# Patient Record
Sex: MALE | Race: WHITE | NOT HISPANIC OR LATINO | Employment: FULL TIME | URBAN - NONMETROPOLITAN AREA
[De-identification: names, ages, dates, MRNs, and addresses within clinical notes are randomized per-mention and may not be internally consistent; named-entity substitution may affect disease eponyms.]

---

## 2018-04-23 ENCOUNTER — OUTSIDE FACILITY SERVICE (OUTPATIENT)
Dept: CARDIOLOGY | Facility: CLINIC | Age: 23
End: 2018-04-23

## 2018-04-24 ENCOUNTER — OFFICE VISIT (OUTPATIENT)
Dept: CARDIOLOGY | Facility: CLINIC | Age: 23
End: 2018-04-24

## 2018-04-24 VITALS
OXYGEN SATURATION: 98 % | HEART RATE: 93 BPM | WEIGHT: 232 LBS | SYSTOLIC BLOOD PRESSURE: 108 MMHG | BODY MASS INDEX: 35.16 KG/M2 | DIASTOLIC BLOOD PRESSURE: 80 MMHG | HEIGHT: 68 IN

## 2018-04-24 DIAGNOSIS — R07.9 CHEST PAIN, UNSPECIFIED TYPE: Primary | ICD-10-CM

## 2018-04-24 DIAGNOSIS — I49.1 PREMATURE ATRIAL CONTRACTIONS: ICD-10-CM

## 2018-04-24 DIAGNOSIS — I47.1 PAROXYSMAL SVT (SUPRAVENTRICULAR TACHYCARDIA) (HCC): ICD-10-CM

## 2018-04-24 DIAGNOSIS — I49.3 PREMATURE VENTRICULAR BEAT: ICD-10-CM

## 2018-04-24 PROBLEM — E66.9 CLASS 1 OBESITY WITHOUT SERIOUS COMORBIDITY WITH BODY MASS INDEX (BMI) OF 34.0 TO 34.9 IN ADULT: Status: ACTIVE | Noted: 2018-04-24

## 2018-04-24 PROCEDURE — 99243 OFF/OP CNSLTJ NEW/EST LOW 30: CPT | Performed by: NURSE PRACTITIONER

## 2018-04-24 RX ORDER — LANOLIN ALCOHOL/MO/W.PET/CERES
500 CREAM (GRAM) TOPICAL NIGHTLY
COMMUNITY

## 2018-04-24 RX ORDER — ERGOCALCIFEROL 1.25 MG/1
50000 CAPSULE ORAL WEEKLY
COMMUNITY

## 2018-04-24 NOTE — PROGRESS NOTES
Subjective:     Encounter Date:04/24/2018    Chief Complaint:    Patient ID: Km Bess is a 22 y.o. male here today for referred for cardiac evaluation of SVT seen on recent Holter Monitor by THOMAS Mcgraw after being evaluated by her for chest pain and fatigue.    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 He had an episode of blurred vision, flushing, and palpitations while at work late March 2018. His Holter monitor revealed frequent PACs, rare PVCs, and occasional brief episodes of SVT primarily atrial tachycardia were identified. His minimum heart rate was 38 bpm at 9 pm with longest pause of 1.7 seconds. He reports his mother has PACs. He has also experienced chest pain intermittently about 3 weeks ago that he describes as sharp, L anterior, for a few seconds. He started testosterone replacement therapy approximately 3 weeks ago.       Chest Pain    This is a new problem. The current episode started 1 to 4 weeks ago. The onset quality is sudden. The problem occurs intermittently. The problem has been unchanged. The pain is present in the lateral region. The pain is moderate. The quality of the pain is described as sharp. The pain does not radiate. Associated symptoms include malaise/fatigue, palpitations and weakness. Pertinent negatives include no abdominal pain, back pain, cough, dizziness, fever, headaches, irregular heartbeat or shortness of breath. Risk factors include hormone replacement therapy.   His past medical history is significant for hyperlipidemia.   His family medical history is significant for hypertension.   Pertinent negatives for family medical history include: no CAD and no diabetes.   Palpitations    This is a new problem. The current episode started 1 to 4 weeks ago. The problem occurs intermittently. The problem has been resolved. The symptoms are aggravated by stress. Associated symptoms include anxiety, chest pain,  malaise/fatigue and weakness. Pertinent negatives include no coughing, dizziness, fever, irregular heartbeat or shortness of breath. He has tried nothing for the symptoms. Risk factors include being male, dyslipidemia, obesity and stress.     HPI     Chest Pain    Additional comments: sharp pain, L chest, started 3-4 weeks ago, random, no precipitating factors, no SOA, near syncope, or dizziness. Only lasts for a second.  Started testosterone 4/6/2018.  Had an episode of blurred vision, flushing, palpitations while at work late March 2018. Had Holter. 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        Last edited by THOMAS Keyes on 4/24/2018 11:26 AM. (History)        History:   Past Medical History:   Diagnosis Date   • Chest pain    • Fatigue    • Hypogonadism male      Past Surgical History:   Procedure Laterality Date   • CYST REMOVAL     • EYE SURGERY      Lasix   • WISDOM TOOTH EXTRACTION       Social History     Social History   • Marital status: Single     Spouse name: N/A   • Number of children: N/A   • Years of education: N/A     Occupational History   • Not on file.     Social History Main Topics   • Smoking status: Passive Smoke Exposure - Never Smoker   • Smokeless tobacco: Never Used   • Alcohol use No   • Drug use: No   • Sexual activity: Defer     Other Topics Concern   • Not on file     Social History Narrative   • No narrative on file     Family History   Problem Relation Age of Onset   • Hypertension Father    • Arrhythmia Mother    • Autoimmune disease Sister    • COPD Maternal Grandmother    • COPD Maternal Grandfather    • COPD Paternal Grandmother    • Hypertension Paternal Grandmother    • Cancer Paternal Grandfather        Outpatient Prescriptions Marked as Taking for the 4/24/18 encounter (Office Visit) with THOMAS Keyes   Medication Sig Dispense Refill   • Multiple Vitamins-Minerals (MULTIVITAMIN ADULT PO) Take 1 tablet by mouth  "Daily.     • niacin (NIACIN SR,NIASPAN ER) 500 MG CR capsule Take 500 mg by mouth Every Night.     • Omega-3 Fatty Acids (FISH OIL) 1000 MG capsule capsule Take 1,200 mg by mouth Daily With Breakfast. Takes 2 tablets daily     • testosterone cypionate (DEPO-TESTOSTERONE) 100 MG/ML solution injection Inject 200 mg into the shoulder, thigh, or buttocks Every 14 (Fourteen) Days.     • vitamin D (ERGOCALCIFEROL) 94588 units capsule capsule Take 50,000 Units by mouth 1 (One) Time Per Week.       Review of Systems:  Review of Systems   Constitution: Positive for weakness and malaise/fatigue. Negative for chills, decreased appetite and fever.   HENT: Positive for nosebleeds. Negative for congestion.    Eyes: Positive for blurred vision. Negative for double vision.   Cardiovascular: Positive for chest pain and palpitations. Negative for irregular heartbeat and leg swelling.   Respiratory: Negative for cough and shortness of breath.    Endocrine: Negative for cold intolerance and heat intolerance.   Hematologic/Lymphatic: Does not bruise/bleed easily.   Skin: Negative for dry skin, itching and rash.   Musculoskeletal: Negative for back pain, joint pain, muscle cramps and neck pain.   Gastrointestinal: Negative for abdominal pain, constipation, diarrhea, heartburn and melena.   Genitourinary: Negative for dysuria, frequency and hematuria.   Neurological: Positive for light-headedness and loss of balance. Negative for dizziness and headaches.   Psychiatric/Behavioral: Negative for depression. The patient is nervous/anxious. The patient does not have insomnia.           Objective:   /80 (BP Location: Left arm, Patient Position: Sitting, Cuff Size: Adult)   Pulse 93   Ht 172.7 cm (68\")   Wt 105 kg (232 lb)   SpO2 98%   BMI 35.28 kg/m²   Wt Readings from Last 3 Encounters:   04/24/18 105 kg (232 lb)   03/29/18 103 kg (228 lb)         Physical Exam   Constitutional: He is oriented to person, place, and time. He appears " well-developed and well-nourished.   HENT:   Head: Normocephalic and atraumatic.   Right Ear: External ear normal.   Left Ear: External ear normal.   Nose: Nose normal.   Mouth/Throat: Oropharynx is clear and moist.   Eyes: Conjunctivae and EOM are normal. Pupils are equal, round, and reactive to light. Right eye exhibits no discharge. Left eye exhibits no discharge. No scleral icterus.   Neck: Normal range of motion. Neck supple. No JVD present. No tracheal deviation present. No thyromegaly present.   Cardiovascular: Normal rate and regular rhythm.  Exam reveals no gallop and no friction rub.    No murmur heard.  Pulmonary/Chest: Effort normal and breath sounds normal. He has no wheezes. He has no rales.   Abdominal: Soft. Bowel sounds are normal. He exhibits no mass. There is no tenderness. There is no rebound and no guarding.   Musculoskeletal: He exhibits no edema, tenderness or deformity.   Lymphadenopathy:     He has no cervical adenopathy.   Neurological: He is alert and oriented to person, place, and time. No cranial nerve deficit.   Skin: Skin is warm and dry.   Psychiatric: He has a normal mood and affect. His behavior is normal. Judgment and thought content normal.   Vitals reviewed.      Lab/Diagnostics Review:   04/04/2018 48 hour Holter monitor with occasional episodes ofprimarily atrial tachycardia.  Minimum heart rate 38 bpm and presumed to be asymptomatic.    03/29/2018 EKG sinus rhythm 79 bpm normal ECG    03/26/2018   CBC WBC 5400, hemoglobin 14.8, hematocrit 44.4%, platelets 233,000  BMP sodium 140, potassium 4.4, chloride 104, BUN 15, Creat 1.11, calcium 9.0, glucose 102  TSH 1.6.5  Testosterone 215, Free Testosterone 51.7  Vitamin D hydroxy 23, vitamin D3 23, vitamin D2 less than 4  Vitamin B12 532    Procedures: none in office today        Assessment/Plan:         Problem List Items Addressed This Visit        Cardiovascular and Mediastinum    Premature atrial contractions    Current  Assessment & Plan     Stable, avoid caffeine, call if worsens. Encouraged him to change to day shift if possible.          Premature ventricular beat    Current Assessment & Plan     Rare per 3/2018 holter         Paroxysmal SVT (supraventricular tachycardia)    Current Assessment & Plan     Stable, call if worsens, discussed avoidance of caffeine, steroids (if possible), and decongestants. Will try to avoid beta-blockers if possible given fatigue and good/normal BP.             Nervous and Auditory    Chest pain - Primary    Current Assessment & Plan     Atypical, low risk of ischemia, call if worsens or doesn't improve and will consider at least TST. May be related to testosterone replacement. Pt to decide if benefits outweigh risks and discuss with PCP.            Other Visit Diagnoses    None.       Return if symptoms worsen or fail to improve.           Anum Lopez, APRN, ACNP-BC, CHFN-BC

## 2018-04-24 NOTE — ASSESSMENT & PLAN NOTE
Stable, call if worsens, discussed avoidance of caffeine, steroids (if possible), and decongestants. Will try to avoid beta-blockers if possible given fatigue and good/normal BP.

## 2018-04-24 NOTE — ASSESSMENT & PLAN NOTE
Atypical, low risk of ischemia, call if worsens or doesn't improve and will consider at least TST. May be related to testosterone replacement. Pt to decide if benefits outweigh risks and discuss with PCP.

## 2018-04-24 NOTE — ASSESSMENT & PLAN NOTE
Encouraged healthy diet and routine aerobic exercise to help achieve weight loss and promote health. He hopes to start exercising more regularly soon.

## 2018-05-30 PROCEDURE — 93272 ECG/REVIEW INTERPRET ONLY: CPT | Performed by: INTERNAL MEDICINE
